# Patient Record
Sex: MALE | Race: WHITE | NOT HISPANIC OR LATINO | URBAN - NONMETROPOLITAN AREA
[De-identification: names, ages, dates, MRNs, and addresses within clinical notes are randomized per-mention and may not be internally consistent; named-entity substitution may affect disease eponyms.]

---

## 2023-12-12 NOTE — PROGRESS NOTES
Chief complaint/Reason for consult: T1DM    Consult requested by TRACI Rocha    HPI: Mr. Vivas is a 43-year-old man who comes for consultation of diabetes.    Labs reviewed  7/6/2023  Hemoglobin 14.8  Creatinine 1.13  Total cholesterol 151, triglycerides 199, HDL 52, LDL 66  Hemoglobin A1c 11.0%    # Sfyd9ES, Uncontrolled due to hyperglycemia  with complications  # HFrEF  # Diabetic retinopathy   # Diabetic polyneuropathy    # History of DKA   - Diagnosed: 2016   - Current regimen includes: Basaglar 100u daily, occasionally will take Humulin 70/30 up to 5-10u per day if BG is above 500mg/dl   - Compliance with medications is poor  - HbA1c: 11.2% today  - Checks FSBG daily, reports 8 times per day but did not bring meter   - Reports BG is usually in the 300-400s   - BG review unavailable   - Hypoglycemia does not occur, reports feeling poorly when he is in the 100s   - Patient has symptoms with lows when he is less than 200mg/dl   - Hyperglycemia occurs daily, he reports feeling well with BG in the 300s   - Patient reports neuropathy   - Patient denies gastroparesis   - Patient is not rotating injection sites; has lipohypertrophy in abdomen   - Patient without known ASCVD, reports having HFrEF of unknown etiology  - taking ACEi/ARB; blood pressure today 130/74    DM Health Maintenance:  Ophtho: due   Monofilament / Foot exam: Due  Lipids/Statin: not taking a statin with last FLP showing LDL 66 done 7/6/2023  GUILLE: due  TSH: due   Aspirin: not taking    Past medical history, past surgical history, family history and social history reviewed within this encounter.     Review of Systems   Constitutional:  Positive for fatigue and unexpected weight change. Negative for activity change.   HENT:  Negative for trouble swallowing and voice change.    Eyes:  Positive for visual disturbance.   Respiratory:  Negative for shortness of breath.    Cardiovascular:  Negative for chest pain.   Gastrointestinal:  Negative for  [facility] Postpartum Note    S:  Patient without complaints.  Minimal lochia.  No dizziness with ambulation    O:  Blood pressure 123/68, temperature 98.4  F (36.9  C), temperature source Oral, resp. rate 18, last menstrual period 09/25/2020, SpO2 98 %, unknown if currently breastfeeding.        Urine output adequate        Abdomen - Fundus firm, at umbilicus, nontender        Extremities - No calf tenderness    Labs-  Results for TOM MARTINEZ (MRN [id number]) as of 6/27/2021 07:36   Ref. Range 6/27/2021 01:25   WBC Latest Ref Range: 4.0 - 11.0 10e9/L 21.8 (H)   Hemoglobin Latest Ref Range: 11.7 - 15.7 g/dL 10.4 (L)   Hematocrit Latest Ref Range: 35.0 - 47.0 % 31.3 (L)   Platelet Count Latest Ref Range: 150 - 450 10e9/L 237       A:   Postpartum Day# 1, s/p Vaginal delivery - doing well        S/P PPH 1500 ml - no sx's.        Elev WBC immediately PP - likely due to normal demargination from delivery, no sx's infection    P:  1)  Routine care        2)  CBC pending this AM. If Hgb < 9 will start po Fe.        3)  Probable D/C tomorrow    Willian Walters MD           "abdominal pain.   Endocrine: Positive for polydipsia and polyuria. Negative for cold intolerance and heat intolerance.   Genitourinary:  Negative for dysuria.   Musculoskeletal:  Positive for arthralgias. Negative for gait problem.   Skin:  Negative for rash and wound.   Neurological:  Positive for numbness.   Psychiatric/Behavioral:  Negative for agitation.         /74   Pulse 95   Ht 182.9 cm (72\")   Wt 61.7 kg (136 lb)   SpO2 98%   BMI 18.44 kg/m²      Physical Exam  Vitals reviewed.   Constitutional:       General: He is not in acute distress.  HENT:      Head: Normocephalic.      Nose: Nose normal.   Eyes:      Conjunctiva/sclera: Conjunctivae normal.   Cardiovascular:      Rate and Rhythm: Normal rate.      Pulses:           Dorsalis pedis pulses are 2+ on the right side and 2+ on the left side.   Pulmonary:      Effort: Pulmonary effort is normal.   Abdominal:      Tenderness: There is no guarding.   Musculoskeletal:         General: Normal range of motion.      Right foot: No deformity or bunion.      Left foot: No deformity or bunion.        Feet:    Feet:      Right foot:      Protective Sensation: 8 sites tested.  8 sites sensed.      Skin integrity: Dry skin present.      Toenail Condition: Right toenails are normal.      Left foot:      Protective Sensation: 8 sites tested.  8 sites sensed.      Skin integrity: Dry skin present.      Toenail Condition: Left toenails are normal.      Comments: Pre-ulcerative callus on right great toe  Skin:     General: Skin is warm and dry.   Neurological:      Mental Status: He is alert and oriented to person, place, and time.   Psychiatric:         Mood and Affect: Mood normal.         Behavior: Behavior normal.         Thought Content: Thought content normal.        Labs and images reviewed as noted in the HPI    Assessment and plan:    Diagnoses and all orders for this visit:    1. Type 1 diabetes mellitus with hyperglycemia (Primary)  Assessment & " Plan:  -Unclear if type 1 diabetes or type 2 diabetes will check antibodies and get C-peptide today  -Diabetes is uncontrolled due to hyperglycemia  -Complications include known diabetic polyneuropathy, diabetic retinopathy, HFrEF and history of DKA  -Patient remains very high risk for complications due to persistent hyperglycemia; counseled that long term hyperglycemia can cause worsening neuropathy, kidney damage, eye damage, and cardiovascular disease   -Suspect patient has a large component of medication noncompliance; reports taking Basaglar 100 units daily stating that the pen lasts for 3 weeks when it should only last for 3 days at this dose  -Recommend simplification of regimen  -Stop Basaglar  -Start premix 70/30 18 units before breakfast and 18 units before supper  -Recommend checking FSBG twice daily prior to injections and bring log to visit  -Recommend avoiding injections in abdomen where he has evidence of lipohypertrophy  -Continue ARB; blood pressure today 130/74    DM Health Maintenance:  Ophtho: due, recommend he have it done in the coming weeks  Monofilament / Foot exam: Completed today as noted above  Lipids/Statin: Not taking a statin with last FLP showing LDL 66 done 7/6/2023  GUILLE: due, will get with next set of labs  TSH: Ordered today  Aspirin: not taking    Orders:  -     POC Glycosylated Hemoglobin (Hb A1C)  -     C-Peptide; Future  -     Basic Metabolic Panel; Future  -     ZNT8 Antibodies; Future  -     IA-2 Autoantibodies; Future  -     Glutamic Acid Decarboxylase; Future  -     Anti-islet Cell Antibody; Future  -     Insulin NPH Isophane & Regular (NovoLIN 70/30 FlexPen) (70-30) 100 UNIT/ML suspension pen-injector; Inject 18u with breakfast and 18u with supper; titrate for max daily dose of 100u  Dispense: 30 mL; Refill: 5  -     BD Pen Needle Luann 2nd Gen 32G X 4 MM misc; Use as directed twice daily with insulin  Dispense: 200 each; Refill: 3  -     TSH; Future  -     glucose monitor  monitoring kit; Use to monitor BG up to 4 times daily  Dispense: 1 each; Refill: 0  -     glucose blood test strip; Use as directed up to 4 times per day  Dispense: 60 each; Refill: 5  -     Lancets misc; Use 1 each 4 (Four) Times a Day.  Dispense: 400 each; Refill: 3    2. Lipohypertrophy  Assessment & Plan:  -Counseled on proper injection technique and location and to avoid the area of his abdomen with lipohypertrophy until it resolves         Return for T1DM 11:30AM on 1/31/2024.     Electronically signed by: Kyle S Rosenstein, MD   Addendum  Labs 12/13/2023  Serum glucose 505 mg/dL  C-peptide undetectable  TSH 3.110  Patient likely has T1DM  Called patient he reports his blood glucose is elevated at home, he is taking insulin.  He does not have abdominal pain or nausea at this time.  Recommend taking 10 units of 70/30, drinking plenty of water and if he develops any symptoms of abdominal pain or nausea to notify me or go to the emergency department for evaluation of DKA.  He voiced understanding and would notify me later this afternoon how he was doing.    MADELYN-65 positive, he has T1DM

## 2023-12-13 ENCOUNTER — LAB (OUTPATIENT)
Dept: FAMILY MEDICINE CLINIC | Facility: CLINIC | Age: 43
End: 2023-12-13
Payer: MEDICAID

## 2023-12-13 ENCOUNTER — OFFICE VISIT (OUTPATIENT)
Dept: ENDOCRINOLOGY | Facility: CLINIC | Age: 43
End: 2023-12-13
Payer: MEDICAID

## 2023-12-13 VITALS
WEIGHT: 136 LBS | OXYGEN SATURATION: 98 % | HEART RATE: 95 BPM | DIASTOLIC BLOOD PRESSURE: 74 MMHG | HEIGHT: 72 IN | BODY MASS INDEX: 18.42 KG/M2 | SYSTOLIC BLOOD PRESSURE: 130 MMHG

## 2023-12-13 DIAGNOSIS — E65 LIPOHYPERTROPHY: ICD-10-CM

## 2023-12-13 DIAGNOSIS — E10.65 TYPE 1 DIABETES MELLITUS WITH HYPERGLYCEMIA: Primary | ICD-10-CM

## 2023-12-13 DIAGNOSIS — E10.39 TYPE 1 DIABETES MELLITUS WITH OTHER OPHTHALMIC COMPLICATION: ICD-10-CM

## 2023-12-13 PROBLEM — E10.9 DIABETES MELLITUS TYPE I: Status: ACTIVE | Noted: 2023-12-13

## 2023-12-13 LAB
EXPIRATION DATE: ABNORMAL
HBA1C MFR BLD: 11.2 % (ref 4.5–5.7)
Lab: ABNORMAL

## 2023-12-13 RX ORDER — SPIRONOLACTONE 25 MG/1
25 TABLET ORAL DAILY
COMMUNITY

## 2023-12-13 RX ORDER — HUMAN INSULIN 100 [IU]/ML
INJECTION, SUSPENSION SUBCUTANEOUS
Qty: 30 ML | Refills: 5 | Status: SHIPPED | OUTPATIENT
Start: 2023-12-13

## 2023-12-13 RX ORDER — SACUBITRIL AND VALSARTAN 24; 26 MG/1; MG/1
1 TABLET, FILM COATED ORAL 2 TIMES DAILY
COMMUNITY

## 2023-12-13 RX ORDER — PEN NEEDLE, DIABETIC 32GX 5/32"
NEEDLE, DISPOSABLE MISCELLANEOUS
COMMUNITY
Start: 2023-08-26 | End: 2023-12-13 | Stop reason: SDUPTHER

## 2023-12-13 RX ORDER — FUROSEMIDE 40 MG/5ML
SOLUTION ORAL DAILY
COMMUNITY

## 2023-12-13 RX ORDER — METOPROLOL TARTRATE 50 MG/1
50 TABLET, FILM COATED ORAL 2 TIMES DAILY
COMMUNITY

## 2023-12-13 RX ORDER — BLOOD-GLUCOSE METER
KIT MISCELLANEOUS
Qty: 1 EACH | Refills: 0 | Status: SHIPPED | OUTPATIENT
Start: 2023-12-13

## 2023-12-13 RX ORDER — LANCETS 30 GAUGE
1 EACH MISCELLANEOUS 4 TIMES DAILY
Qty: 400 EACH | Refills: 3 | Status: SHIPPED | OUTPATIENT
Start: 2023-12-13

## 2023-12-13 RX ORDER — PEN NEEDLE, DIABETIC 32GX 5/32"
NEEDLE, DISPOSABLE MISCELLANEOUS
Qty: 200 EACH | Refills: 3 | Status: SHIPPED | OUTPATIENT
Start: 2023-12-13

## 2023-12-13 NOTE — ASSESSMENT & PLAN NOTE
-Counseled on proper injection technique and location and to avoid the area of his abdomen with lipohypertrophy until it resolves

## 2023-12-13 NOTE — ASSESSMENT & PLAN NOTE
-Unclear if type 1 diabetes or type 2 diabetes will check antibodies and get C-peptide today  -Diabetes is uncontrolled due to hyperglycemia  -Complications include known diabetic polyneuropathy, diabetic retinopathy, HFrEF and history of DKA  -Patient remains very high risk for complications due to persistent hyperglycemia; counseled that long term hyperglycemia can cause worsening neuropathy, kidney damage, eye damage, and cardiovascular disease   -Suspect patient has a large component of medication noncompliance; reports taking Basaglar 100 units daily stating that the pen lasts for 3 weeks when it should only last for 3 days at this dose  -Recommend simplification of regimen  -Stop Basaglar  -Start premix 70/30 18 units before breakfast and 18 units before supper  -Recommend checking FSBG twice daily prior to injections and bring log to visit  -Recommend avoiding injections in abdomen where he has evidence of lipohypertrophy  -Continue ARB; blood pressure today 130/74    DM Health Maintenance:  Ophtho: due, recommend he have it done in the coming weeks  Monofilament / Foot exam: Completed today as noted above  Lipids/Statin: Not taking a statin with last FLP showing LDL 66 done 7/6/2023  GUILLE: due, will get with next set of labs  TSH: Ordered today  Aspirin: not taking

## 2023-12-14 ENCOUNTER — TELEPHONE (OUTPATIENT)
Dept: FAMILY MEDICINE CLINIC | Facility: CLINIC | Age: 43
End: 2023-12-14
Payer: MEDICAID

## 2023-12-14 LAB
BUN SERPL-MCNC: 22 MG/DL (ref 6–24)
BUN/CREAT SERPL: 23 (ref 9–20)
C PEPTIDE SERPL-MCNC: <0.1 NG/ML (ref 1.1–4.4)
CALCIUM SERPL-MCNC: 9.5 MG/DL (ref 8.7–10.2)
CHLORIDE SERPL-SCNC: 100 MMOL/L (ref 96–106)
CO2 SERPL-SCNC: 22 MMOL/L (ref 20–29)
CREAT SERPL-MCNC: 0.94 MG/DL (ref 0.76–1.27)
EGFRCR SERPLBLD CKD-EPI 2021: 102 ML/MIN/1.73
GLUCOSE SERPL-MCNC: 505 MG/DL (ref 70–99)
POTASSIUM SERPL-SCNC: 5.4 MMOL/L (ref 3.5–5.2)
SODIUM SERPL-SCNC: 137 MMOL/L (ref 134–144)
TSH SERPL DL<=0.005 MIU/L-ACNC: 3.11 UIU/ML (ref 0.45–4.5)

## 2023-12-18 LAB
GAD65 AB SER IA-ACNC: 5.6 U/ML (ref 0–5)
ISLET CELL512 AB SER-ACNC: <7.5 U/ML
ZNT8 AB SERPL IA-ACNC: 114 U/ML

## 2023-12-19 LAB — PANC ISLET CELL AB TITR SER: NEGATIVE {TITER}

## 2024-01-29 NOTE — PROGRESS NOTES
Chief complaint/Reason for consult: T1DM     HPI: Mr. Vivas is a 44-year-old man who comes for follow-up of diabetes. He was last seen 12/13/2023 and reports since that time significant improvement in his glucoses although continues to have hyperglycemia and an occasional low.    # [id number], Uncontrolled due to hyperglycemia and hypoglycemia with complications  # HFrEF  # Diabetic retinopathy   # Diabetic polyneuropathy    # History of DKA   - Diagnosed: 2016; undetectable c-peptide with glc of 505mg/dl and positive MADELYN-65 / ZnT8   - Current regimen includes: 70/30 18u with breakfast and 18u with supper  - Using premix due to compliance issues in the past with insulin   - Compliance with medications is good since last visit  - HbA1c: 11.2% 12/13/2023  - Checks FSBG twice daily   - BG review shows variable glucose with range 48-465mg/dl but most of glucoses or high 100s to mid 200s    - Hypoglycemia occurs much less frequently since last visit; when he skipped a meal after taking insulin, has symptoms with BG in 100s   - Hyperglycemia occurs frequently due to variable diet   - Patient reports neuropathy that is stable   - Patient denies gastroparesis   - Patient is rotating injection sites in thighs; has lipohypertrophy in abdomen and is avoiding it   - Patient without known ASCVD, reports having HFrEF of unknown etiology  - taking ACEi/ARB; blood pressure today 136/70     DM Health Maintenance:  Ophtho: due   Monofilament / Foot exam: completed 12/13/2023   Lipids/Statin: not taking a statin with last FLP showing LDL 66 done 7/6/2023  GUILLE: due  TSH: 3.110 done 12/13/2023    Aspirin: not taking    Past medical history, past surgical history, family history and social history reviewed within this encounter.     Review of Systems   Constitutional:  Negative for activity change and unexpected weight change.   HENT:  Negative for trouble swallowing and voice change.    Eyes:  Negative for visual disturbance.  "  Respiratory:  Negative for shortness of breath.    Cardiovascular:  Positive for leg swelling. Negative for chest pain.   Gastrointestinal:  Negative for abdominal pain.   Endocrine: Negative for cold intolerance and heat intolerance.   Genitourinary:  Negative for dysuria.   Musculoskeletal:  Negative for gait problem.   Skin:  Negative for rash.   Neurological:  Positive for numbness.   Psychiatric/Behavioral:  Negative for agitation and confusion.         /70   Pulse 83   Ht 182.9 cm (72\")   Wt 64.4 kg (142 lb)   SpO2 97%   BMI 19.26 kg/m²      Physical Exam  Vitals reviewed.   Constitutional:       Appearance: He is normal weight.   HENT:      Head: Normocephalic.      Nose: Nose normal.   Eyes:      Conjunctiva/sclera: Conjunctivae normal.   Cardiovascular:      Rate and Rhythm: Normal rate.   Pulmonary:      Effort: Pulmonary effort is normal.   Abdominal:      Tenderness: There is no guarding.   Musculoskeletal:         General: No deformity.   Skin:     General: Skin is warm and dry.   Neurological:      Mental Status: He is alert and oriented to person, place, and time.   Psychiatric:         Mood and Affect: Mood normal.         Behavior: Behavior normal.         Thought Content: Thought content normal.          Labs and images reviewed as noted in the HPI    Assessment and plan:    Diagnoses and all orders for this visit:    1. Type 1 diabetes mellitus with hyperglycemia (Primary)  Assessment & Plan:  -Diabetes remains uncontrolled due to hyperglycemia but is improving  -Complications include known diabetic retinopathy and diabetic polyneuropathy  -Patient remains high risk for complications due to persistent hyperglycemia; counseled that long term hyperglycemia can cause worsening neuropathy, kidney damage, eye damage, and cardiovascular disease  -Counseled that premixed insulin is not ideal for T1DM but given his history of compliance issues in the past he prefers to stick to the current " regimen  -Recommend increasing 70/30 to 20 units with breakfast and 20 units with supper; take a half dose if he plans to skip a meal  -Continue to check FSBG twice daily  -Will consider switching to MDI at next visit  -Will try to get CGM at next visit, patient currently happy with FSBG  -Continue Entresto per cardiology; blood pressure today 136/70     DM Health Maintenance:  Ophtho: due; he reports not having time off work to schedule it  Monofilament / Foot exam: completed 12/13/2023   Lipids/Statin: not taking a statin with last FLP showing LDL 66 done 7/6/2023  GUILLE: Ordered to be done prior to next visit  TSH: 3.110 done 12/13/2023    Aspirin: not taking      Orders:  -     Insulin NPH Isophane & Regular (NovoLIN 70/30 FlexPen) (70-30) 100 UNIT/ML suspension pen-injector; Inject 20u with breakfast and 20u with supper; titrate for max daily dose of 100u  Dispense: 30 mL; Refill: 5  -     Microalbumin / Creatinine Urine Ratio - Urine, Clean Catch; Future    2. Lipohypertrophy  Assessment & Plan:  -Improving slightly, recommend he continue to avoid injections in the abdomen         Return in about 2 months (around 3/31/2024) for T1DM.     Electronically signed by: Kyle S Rosenstein, MD

## 2024-01-31 ENCOUNTER — OFFICE VISIT (OUTPATIENT)
Dept: ENDOCRINOLOGY | Facility: CLINIC | Age: 44
End: 2024-01-31
Payer: MEDICAID

## 2024-01-31 VITALS
BODY MASS INDEX: 19.23 KG/M2 | DIASTOLIC BLOOD PRESSURE: 70 MMHG | WEIGHT: 142 LBS | SYSTOLIC BLOOD PRESSURE: 136 MMHG | OXYGEN SATURATION: 97 % | HEIGHT: 72 IN | HEART RATE: 83 BPM

## 2024-01-31 DIAGNOSIS — E10.65 TYPE 1 DIABETES MELLITUS WITH HYPERGLYCEMIA: Primary | ICD-10-CM

## 2024-01-31 DIAGNOSIS — E65 LIPOHYPERTROPHY: ICD-10-CM

## 2024-01-31 RX ORDER — HUMAN INSULIN 100 [IU]/ML
INJECTION, SUSPENSION SUBCUTANEOUS
Qty: 30 ML | Refills: 5 | Status: SHIPPED | OUTPATIENT
Start: 2024-01-31

## 2024-01-31 NOTE — ASSESSMENT & PLAN NOTE
-Diabetes remains uncontrolled due to hyperglycemia but is improving  -Complications include known diabetic retinopathy and diabetic polyneuropathy  -Patient remains high risk for complications due to persistent hyperglycemia; counseled that long term hyperglycemia can cause worsening neuropathy, kidney damage, eye damage, and cardiovascular disease  -Counseled that premixed insulin is not ideal for T1DM but given his history of compliance issues in the past he prefers to stick to the current regimen  -Recommend increasing 70/30 to 20 units with breakfast and 20 units with supper; take a half dose if he plans to skip a meal  -Continue to check FSBG twice daily  -Will consider switching to MDI at next visit  -Will try to get CGM at next visit, patient currently happy with FSBG  -Continue Entresto per cardiology; blood pressure today 136/70     DM Health Maintenance:  Ophtho: due; he reports not having time off work to schedule it  Monofilament / Foot exam: completed 12/13/2023   Lipids/Statin: not taking a statin with last FLP showing LDL 66 done 7/6/2023  GUILLE: Ordered to be done prior to next visit  TSH: 3.110 done 12/13/2023    Aspirin: not taking

## 2024-03-21 NOTE — PROGRESS NOTES
Chief complaint/Reason for consult: T1DM    HPI: Mr. Vivas is a 44-year-old man who comes for follow-up of diabetes. He was last seen 1/31/2024 and reports since that time feeling better but still having glycemic variability.      # [id number], Uncontrolled due to hyperglycemia and hypoglycemia with complications  # HFrEF  # Diabetic retinopathy   # Diabetic polyneuropathy    # History of DKA   - Diagnosed: 2016; undetectable c-peptide with glc of 505mg/dl and positive MADELYN-65 / ZnT8   - Current regimen includes: 70/30 33u with breakfast and 33u with supper  - Using premix due to compliance issues in the past with insulin; patient has been counseled many times that premixed insulin is not ideal for T1DM and MDI is preferred  - Compliance with medications is good   - HbA1c: 11.0% today  - Checks FSBG twice daily   - BG continue to be variable ranging from 400 to 500 mg/dL  - Hypoglycemia occurs worse during the day  - Hyperglycemia occurs frequently due to variable diet   - Patient reports neuropathy that is stable   - Patient denies gastroparesis   - Patient is rotating injection sites in thighs; has lipohypertrophy in abdomen and is avoiding it, it is improving  - Patient without known ASCVD, reports having HFrEF of unknown etiology  - taking ACEi/ARB; blood pressure today 120/80      DM Health Maintenance:  Ophtho: due, he reports not having time and is scheduled to get it done at this time   monofilament / Foot exam: completed 12/13/2023   Lipids/Statin: not taking a statin with last FLP showing LDL 66 done 7/6/2023  GUILLE: due  TSH: 3.110 done 12/13/2023    Aspirin: not taking     Past medical history, past surgical history, family history and social history reviewed within this encounter.     Review of Systems   Constitutional:  Negative for activity change.   HENT:  Negative for trouble swallowing and voice change.    Eyes:  Negative for visual disturbance.   Cardiovascular:  Negative for chest pain.  "  Gastrointestinal:  Negative for abdominal pain.   Endocrine: Negative for cold intolerance and heat intolerance.   Musculoskeletal:  Negative for gait problem.   Skin:  Negative for wound.   Neurological:  Positive for numbness.   Psychiatric/Behavioral:  Negative for agitation and confusion.         /80   Pulse 84   Ht 182.9 cm (72.01\")   Wt 64 kg (141 lb)   BMI 19.12 kg/m²      Physical Exam  Vitals reviewed.   Constitutional:       General: He is not in acute distress.  HENT:      Head: Normocephalic.   Eyes:      Conjunctiva/sclera: Conjunctivae normal.   Pulmonary:      Effort: Pulmonary effort is normal.   Abdominal:      Tenderness: There is no guarding.   Musculoskeletal:         General: No deformity.   Skin:     General: Skin is warm and dry.   Neurological:      Mental Status: He is alert and oriented to person, place, and time.   Psychiatric:         Mood and Affect: Mood normal.         Behavior: Behavior normal.          Labs and images reviewed as noted in the HPI    Assessment and plan:    Diagnoses and all orders for this visit:    1. Type 1 diabetes mellitus with hyperglycemia (Primary)  Assessment & Plan:  -Diabetes remains uncontrolled due to hyperglycemia and hypoglycemia  -Complications include known diabetic polyneuropathy, diabetic retinopathy, and history of recurrent DKA  -Patient remains high risk for complications due to persistent hyperglycemia and hypoglycemia; counseled that long term hyperglycemia can cause worsening neuropathy, kidney damage, eye damage and cardiovascular disease  -Ultimately patient be better served by MDI versus insulin pump; due to his work restrictions and prior history of noncompliance will try another 3 months of premixed insulin if he continues to have glycemic variability will urged transition to different modality of insulin delivery  -Start Dexcom CGM  -Continue 70/30, reduce dose to 27 units with breakfast and 30 units with supper  -Continue " ARB; blood pressure today 120/80      DM Health Maintenance:  Ophtho: due, he reports not having time and is scheduled to get it done at this time   monofilament / Foot exam: completed 12/13/2023   Lipids/Statin: not taking a statin with last FLP showing LDL 66 done 7/6/2023  GUILLE: due  TSH: 3.110 done 12/13/2023    Aspirin: not taking    Orders:  -     POC Glycosylated Hemoglobin (Hb A1C)  -     Continuous Blood Gluc Sensor (Dexcom G6 Sensor); Use 1 each Take As Directed.  Dispense: 3 each; Refill: 11  -     Continuous Blood Gluc Transmit (Dexcom G6 Transmitter) misc; Use 1 each Take As Directed.  Dispense: 1 each; Refill: 3  -     Insulin NPH Isophane & Regular (NovoLIN 70/30 FlexPen) (70-30) 100 UNIT/ML suspension pen-injector; Inject 27u with breakfast and 30u with supper; titrate for max daily dose of 100u  Dispense: 30 mL; Refill: 5  -     BD Pen Needle Luann 2nd Gen 32G X 4 MM misc; Use as directed twice daily with insulin  Dispense: 200 each; Refill: 3         Return in about 3 months (around 6/27/2024) for T1DM.     Electronically signed by: Kyle S Rosenstein, MD

## 2024-03-27 ENCOUNTER — OFFICE VISIT (OUTPATIENT)
Dept: ENDOCRINOLOGY | Facility: CLINIC | Age: 44
End: 2024-03-27
Payer: MEDICAID

## 2024-03-27 VITALS
WEIGHT: 141 LBS | DIASTOLIC BLOOD PRESSURE: 80 MMHG | HEART RATE: 84 BPM | SYSTOLIC BLOOD PRESSURE: 120 MMHG | BODY MASS INDEX: 19.1 KG/M2 | HEIGHT: 72 IN

## 2024-03-27 DIAGNOSIS — E10.65 TYPE 1 DIABETES MELLITUS WITH HYPERGLYCEMIA: Primary | ICD-10-CM

## 2024-03-27 LAB
EXPIRATION DATE: ABNORMAL
HBA1C MFR BLD: 11 % (ref 4.5–5.7)
Lab: ABNORMAL

## 2024-03-27 PROCEDURE — 99214 OFFICE O/P EST MOD 30 MIN: CPT | Performed by: HOSPITALIST

## 2024-03-27 PROCEDURE — 1160F RVW MEDS BY RX/DR IN RCRD: CPT | Performed by: HOSPITALIST

## 2024-03-27 PROCEDURE — 1159F MED LIST DOCD IN RCRD: CPT | Performed by: HOSPITALIST

## 2024-03-27 PROCEDURE — 3046F HEMOGLOBIN A1C LEVEL >9.0%: CPT | Performed by: HOSPITALIST

## 2024-03-27 PROCEDURE — 83036 HEMOGLOBIN GLYCOSYLATED A1C: CPT | Performed by: HOSPITALIST

## 2024-03-27 RX ORDER — PROCHLORPERAZINE 25 MG/1
1 SUPPOSITORY RECTAL TAKE AS DIRECTED
Qty: 3 EACH | Refills: 11 | Status: SHIPPED | OUTPATIENT
Start: 2024-03-27

## 2024-03-27 RX ORDER — HUMAN INSULIN 100 [IU]/ML
INJECTION, SUSPENSION SUBCUTANEOUS
Qty: 30 ML | Refills: 5 | Status: SHIPPED | OUTPATIENT
Start: 2024-03-27

## 2024-03-27 RX ORDER — PROCHLORPERAZINE 25 MG/1
1 SUPPOSITORY RECTAL TAKE AS DIRECTED
Qty: 1 EACH | Refills: 3 | Status: SHIPPED | OUTPATIENT
Start: 2024-03-27

## 2024-03-27 RX ORDER — PEN NEEDLE, DIABETIC 32GX 5/32"
NEEDLE, DISPOSABLE MISCELLANEOUS
Qty: 200 EACH | Refills: 3 | Status: SHIPPED | OUTPATIENT
Start: 2024-03-27

## 2024-03-27 NOTE — ASSESSMENT & PLAN NOTE
-Diabetes remains uncontrolled due to hyperglycemia and hypoglycemia  -Complications include known diabetic polyneuropathy, diabetic retinopathy, and history of recurrent DKA  -Patient remains high risk for complications due to persistent hyperglycemia and hypoglycemia; counseled that long term hyperglycemia can cause worsening neuropathy, kidney damage, eye damage and cardiovascular disease  -Ultimately patient be better served by MDI versus insulin pump; due to his work restrictions and prior history of noncompliance will try another 3 months of premixed insulin if he continues to have glycemic variability will urged transition to different modality of insulin delivery  -Start Dexcom CGM  -Continue 70/30, reduce dose to 27 units with breakfast and 30 units with supper  -Continue ARB; blood pressure today 120/80      DM Health Maintenance:  Ophtho: due, he reports not having time and is scheduled to get it done at this time   monofilament / Foot exam: completed 12/13/2023   Lipids/Statin: not taking a statin with last FLP showing LDL 66 done 7/6/2023  GUILLE: due  TSH: 3.110 done 12/13/2023    Aspirin: not taking

## 2024-03-27 NOTE — LETTER
March 27, 2024     Patient: Mikhail Vivas   YOB: 1980   Date of Visit: 3/27/2024       To Whom It May Concern:    Mikhail Vivas  was seen in my office 3/27/2024 .       Sincerely,        Kyle S Rosenstein, MD

## 2024-08-21 DIAGNOSIS — E10.65 TYPE 1 DIABETES MELLITUS WITH HYPERGLYCEMIA: ICD-10-CM

## 2024-08-21 RX ORDER — HUMAN INSULIN 100 [IU]/ML
INJECTION, SUSPENSION SUBCUTANEOUS
Qty: 30 ML | Refills: 4 | Status: SHIPPED | OUTPATIENT
Start: 2024-08-21

## 2024-08-21 NOTE — TELEPHONE ENCOUNTER
Rx Refill Note  Requested Prescriptions     Pending Prescriptions Disp Refills    Insulin NPH Isophane & Regular (NovoLIN 70/30 FlexPen) (70-30) 100 UNIT/ML suspension pen-injector 30 mL 4     Sig: Inject 27u with breakfast and 30u with supper; titrate for max daily dose of 100u      Last office visit with prescribing clinician: 3/27/2024     Next office visit with prescribing clinician: 1/15/2025     Ivet Marcano MA  08/21/24, 12:41 EDT

## 2024-08-21 NOTE — TELEPHONE ENCOUNTER
Caller: DEVONTE FLEMING    Relationship: SELF    Best call back number: [phone]    Requested Prescriptions:   Requested Prescriptions     Pending Prescriptions Disp Refills    Insulin NPH Isophane & Regular (NovoLIN 70/30 FlexPen) (70-30) 100 UNIT/ML suspension pen-injector 30 mL 5     Sig: Inject 27u with breakfast and 30u with supper; titrate for max daily dose of 100u        Pharmacy where request should be sent: [facility] [address] [phone] [facility] [phone]      Last office visit with prescribing clinician: 3/27/2024   Last telemedicine visit with prescribing clinician: Visit date not found   Next office visit with prescribing clinician: 1/15/2025     Additional details provided by patient:     Does the patient have less than a 3 day supply:  [] Yes  [] No    Would you like a call back once the refill request has been completed: [] Yes [] No    If the office needs to give you a call back, can they leave a voicemail: [] Yes [] No    Judah Vega Rep   08/21/24 09:14 EDT

## 2024-11-11 DIAGNOSIS — E10.65 TYPE 1 DIABETES MELLITUS WITH HYPERGLYCEMIA: ICD-10-CM

## 2024-11-11 RX ORDER — HUMAN INSULIN 100 [IU]/ML
INJECTION, SUSPENSION SUBCUTANEOUS
Qty: 90 ML | Refills: 0 | Status: SHIPPED | OUTPATIENT
Start: 2024-11-11

## 2024-11-11 NOTE — TELEPHONE ENCOUNTER
Patient called and is completely out of insulin and pharmacy states they have no rxs there. Will pend rx and send to provider in office taking messages today.

## 2025-01-13 DIAGNOSIS — E10.65 TYPE 1 DIABETES MELLITUS WITH HYPERGLYCEMIA: ICD-10-CM

## 2025-01-13 RX ORDER — HUMAN INSULIN 100 [IU]/ML
INJECTION, SUSPENSION SUBCUTANEOUS
Qty: 90 ML | Refills: 0 | Status: SHIPPED | OUTPATIENT
Start: 2025-01-13

## 2025-01-13 NOTE — TELEPHONE ENCOUNTER
Caller: Ortega Mikhail    Relationship: Self    Best call back number: [phone]    Requested Prescriptions:   Requested Prescriptions     Pending Prescriptions Disp Refills    glucose blood test strip 60 each 5     Sig: Use as directed up to 4 times per day    Insulin NPH Isophane & Regular (NovoLIN 70/30 FlexPen) (70-30) 100 UNIT/ML suspension pen-injector 90 mL 0     Sig: Inject 27u with breakfast and 30u with supper; titrate for max daily dose of 100u        Pharmacy where request should be sent: [facility] [place], [place] - [address] [phone] [facility] [phone] FX     Last office visit with prescribing clinician: 3/27/2024   Last telemedicine visit with prescribing clinician: Visit date not found   Next office visit with prescribing clinician: 7/16/2025     Does the patient have less than a 3 day supply:  [x] Yes  [] No    Would you like a call back once the refill request has been completed: [x] Yes [] No    If the office needs to give you a call back, can they leave a voicemail: [x] Yes [] No    Judah Jones Rep   01/13/25 13:37 EST

## 2025-08-26 DIAGNOSIS — E10.65 TYPE 1 DIABETES MELLITUS WITH HYPERGLYCEMIA: ICD-10-CM

## 2025-08-27 RX ORDER — INSULIN HUMAN 100 [IU]/ML
INJECTION, SUSPENSION SUBCUTANEOUS
Qty: 90 ML | Refills: 0 | Status: SHIPPED | OUTPATIENT
Start: 2025-08-27